# Patient Record
Sex: FEMALE | Race: WHITE | NOT HISPANIC OR LATINO | Employment: OTHER | URBAN - METROPOLITAN AREA
[De-identification: names, ages, dates, MRNs, and addresses within clinical notes are randomized per-mention and may not be internally consistent; named-entity substitution may affect disease eponyms.]

---

## 2024-02-14 ENCOUNTER — TELEPHONE (OUTPATIENT)
Dept: NEPHROLOGY | Facility: CLINIC | Age: 64
End: 2024-02-14

## 2024-02-14 NOTE — TELEPHONE ENCOUNTER
New Patient Intake Form   Patient Details   Shantell Dyer     1960     [id number]     Insurance Information   Name of Insurance Company [employer]    Does the patient need an insurance referral? no   If patient has VA insurance, please ask if they will be using their VA insurance.   Appointment Information   Who is calling to schedule?  If not patient, what is callers name? Patient and Miranda from PCP's office   [facility]   Referring Provider  Dr. Bella King    Reason for Appt (Diagnosis) CKD STAGE 4    Does Patient have labs/urine done at [facility]?  If not, where do they go?  List the date of last lab / urine  *Please try to get labs 2 years back if not at  Yes, [facility]    Has patient been hospitalized recently?  If yes, list name and location of hospital they were in yes   Has patient been seen by a Nephrologist before?  If yes, list name, location and phone number no   Has the patient had renal imaging done?  If so, list the most recent date and type of imaging Yes, [facility]    Does patient have a history of Kidney Stones? yes   Appointment Details   Is there a referral on file? yes    Appointment Date 4/16/24   Location  [facility]   Miscellaneous   added to wait list.

## 2024-02-15 ENCOUNTER — TELEPHONE (OUTPATIENT)
Dept: NEPHROLOGY | Facility: CLINIC | Age: 64
End: 2024-02-15

## 2024-02-15 NOTE — TELEPHONE ENCOUNTER
Called pt to come in for a sooner appt (2/21/24) with Dr. St in the [place] office but pt said she does not want to travel to [place], only [place] or [place].

## 2024-02-15 NOTE — TELEPHONE ENCOUNTER
Called pt to see if she could see Dr. Huff in the [facility] on 2/21/24. Pt agreed and is now scheduled.

## 2024-02-20 NOTE — PROGRESS NOTES
NEPHROLOGY OUTPATIENT CONSULTATION   Shantell Dyer 63 y.o. female MRN: [id number]  Date: 2/21/2024  Reason for consultation:   Chief Complaint   Patient presents with    Consult    Chronic Kidney Disease       ASSESSMENT AND PLAN:  Chronic Kidney Disease Stage 4  -Baseline Creatinine: 2.6-3.0 mg/dl  -Renal Function has been fluctuating with multiple episodes of CANDACE, recently around 2.6-3.0. Last Creatinine was 2.95 mg/dl, EGFR 17  -Etiology: Chronic kidney disease likely due to hypertensive nephrosclerosis, sequelae of recurrent CANDACE, obstructive uropathy, chronic cardiorenal syndrome and age-related nephron loss.  -Patient had cystoscopy with right ureteral stent placement on 2/19 which was the last urology prior to today's visit  -Lasix nuclear scan suggestive of decreased perfusion to both kidneys, left kidney function is at 33% and right kidney function is a 67% also with severely delayed and minimally excreted radio tracer in the left collecting system indicating severe renal dysfunction on the left side and also on the right side  -Plan:   Continue to monitor renal function.  Repeat BMP this week  to monitor renal function status post recent lithotripsy and right ureteral stent exchange.  Recommend oral hydration 60 ounces per day  Avoid Nephrotoxins like NSAIDs and IV contrast.   CKD Education: reffered for modality education  Low k diet and low phosphorus diet discussed.  Follow up in 3 months with labs check BMP this weekend in a month as well.  Discussed about indication for dialysis discussed about uremic symptoms and to call office if she develops any uremic symptoms    Primary Hypertension with CKD4 :   -Current medication: hydralazine 50 mg tid , metoprolol 50 mg daily   -Current blood pressure: stable. Says BP at 120's at home   -Plan:     Continue same meds   -Recommend 2 g sodium diet.    -Recommend daily exercise and weight loss  -Discussed home monitoring of BP and maintaining a log of blood  pressure.  -Recommend goal BP less than 130/80.     Nephrolithiasis  --CAT scan from January 4 mentions about mild right hydronephrosis, upper pole parapelvic cyst, stones at the UPJ on the right.  No left hydronephrosis or nephrolithiasis  -Prior CT from January 21, 2024 did not show any hydronephrosis, finding of bilateral renal cyst. -CT abdomen from February 2024: Mild right hydronephrosis.  Stones in the right renal pelvis measuring 3 to 4 mm in size-.  Evidence of mild left hydronephrosis and mild right hydronephrosis  -PTH from October 13, 2023 was slightly elevated at 139 and vitamin D was normal at 32 at that time  -Interventions: Patient with multiple urologic interventions at this time, records from urology note from [facility] was reviewed  CYSTOSCOPY W/ LASER LITHOTRIPSY 12/07/2021 .CYSTOSCOPY, b/l URETEROSCOPY, HOLMIUM laser lithutripy b/l renal stones, basket extraction of bl renal stones, b/l retrograde pylogram, b/l ueretral stent placement, removal of b/l neph tube  CYSTOSCOPY W/ URETEROSCOPY Bilateral 04/11/2022  and again on  Right 08/22/2022 4/14/2023. Status post left ureteroscopy and double-J stent placement. 3 [place] UPJ intrinsic stenosis.   Status post open bilateral pyeloplasty as per urology note was done in October 2023 2/19/2024-CYSTOSCOPY WITH RIGHT URETEROSCOPY WITH LASER LITHOTRIPSY, WITH RIGHT DOUBLE J STENT EXCHANGE, RETROGRADE  at [facility]  -Stone Analysis: October 2022 calcium oxalate monohydrate.  Previously in 2021 also suggestive of 90% calcium oxalate monohydrate and 10% calcium oxalate dihydrate  -Stone Risk Profile:Ordered   -Plan:   Stressed on oral hydration more than 70 ounces per day.  Stressed on hydration throughout the day to maintain urine output at least 2000 mL/day.  24 hrs urine stone risk profile   Recommend low oxalate, Low Sodium and high calcium diet    Obstructive uropathy/right hydronephrosis and left hydronephrosis  -Status post cystoscopy and right ureteral  stent placement on 2/19 continue to follow-up with urology  -Imaging study per urology    Metabolic acidosis  -Bicarbonate level was 19, check BMP and if it is still low would consider starting on sodium bicarbonate tablets    Hyperphosphatemia: Last phosphorus was 5.8, three weeks back, recommend low phosphorus diet    Persistent proteinuria  -office UA positive for protein, urine microscopy positive for numerous WBCs but no RBCs, no UTI symptoms currently receiving antibiotics  -Last UPC ratio: 1.07 g  -Proteinuria Likely due to hypertension.  Check SPEP UPEP light chain ratio  -Not on ACE inhibitor, would not start on ACE inhibitor at this time in the setting of recurrent CANDACE and k  on higher side   -Stressed on Goal A1c <7.0 and Goal BP <130/80 mmHg  -Continue to monitor.    Anemia iron deficiency  -hb was 10.3 g/dl, iron saturation 8.  Started on ferrous sulfate once daily    Nonischemic cardiomyopathy, chronic systolic CHF ejection fraction 24%, grade 2 diastolic dysfunction  -Never been on any diuretics in the past clinically appears euvolemic, continue to monitor    Secondary hyper parathyroidism of renal origin: PTH was elevated in October, monitor PTH level before the next office visit.  Prior vitamin D level was abnormal range    Tobacco use disorder  -stressed on quitting smoking, patient transferred back on the smoking    History of esophagitis, status post EGD in the past.  Continue management per GI.    Diagnoses and all orders for this visit:    Stage 4 chronic kidney disease (HCC)  -     POCT urine dip  -     Basic metabolic panel; Future  -     Ambulatory referral to ckd education program; Future  -     Stone risk profile; Future  -     Basic metabolic panel; Future  -     Phosphorus; Future  -     Protein / creatinine ratio, urine; Future  -     CBC; Future  -     Magnesium; Future  -     PTH, intact; Future  -     Urinalysis with microscopic; Future  -     Basic metabolic panel; Future    Benign  hypertension with CKD (chronic kidney disease) stage IV ([facility])  -     Stone risk profile; Future  -     Basic metabolic panel; Future    Persistent proteinuria, unspecified  -     Protein / creatinine ratio, urine; Future    Iron deficiency anemia, unspecified iron deficiency anemia type  -     CBC; Future  -     Iron Panel (Includes Ferritin, Iron Sat%, Iron, and TIBC); Future    Hyperphosphatemia  -     Phosphorus; Future    Nephrolithiasis  -     Stone risk profile; Future    Chronic systolic CHF (congestive heart failure) ([facility])  -     Stone risk profile; Future    Secondary hyperparathyroidism of renal origin ([facility])  -     PTH, intact; Future  -     Vitamin D 25 hydroxy; Future    Other orders  -     cefuroxime (CEFTIN) 500 mg tablet; Take 500 mg by mouth 2 (two) times a day  -     hydrALAZINE (APRESOLINE) 50 mg tablet; Take 50 mg by mouth Three times a day  -     metoprolol succinate (TOPROL-XL) 50 mg 24 hr tablet; Take 50 mg by mouth daily  -     oxybutynin (DITROPAN) 5 mg tablet; Take 5 mg by mouth 2 (two) times a day  -     traMADol (ULTRAM) 50 mg tablet; Take 50 mg by mouth every 6 (six) hours as needed for moderate pain or severe pain        Patient Instructions   -Renal Function is stable  -You have Chronic Kidney Disease Stage 4   -Avoid NSAIDs like Ibuprofen/Motrin, Naproxen/Aleve, Celebrex, meloxicam/Mobic, Diclofenac and other NSAIDs.  -Okay to take Acetaminophen/Tylenol if you do not have any liver problems  -Avoid IV contrast used for CT scan and cardiac catheterization.    -If plan for any study with IV contrast, please let me know so we could hydrate with fluids before and after IV contrast  -Dosage  of certain medications may need to be adjusted depending on Kidney function.     Blood pressure is stable    -Recommend 2 g sodium diet.    -Recommend daily exercise and weight loss  -Discussed home monitoring of BP and maintaining a log of blood pressure.  -Recommend goal BP less than 130/80. Please  inform me if SBP below 110 or above 140's persistently.  Check blood work this week, check 24-hour stone risk profile.  Recommend increasing oral hydration to 70 ounces per day.  Recommend low oxalate diet low phosphate and low potassium diet.  Work again in a month, follow-up in 3 months with repeat labs           Potassium Content of Foods List   WHAT YOU NEED TO KNOW:   Potassium is a mineral that is found in most foods. Potassium helps to balance fluids and minerals in your body. It also helps your body maintain a normal blood pressure. Potassium helps your muscles contract and your nerves function normally.  DISCHARGE INSTRUCTIONS:   Why you may need to change the amount of potassium you eat:   You may need more potassium  if you have hypokalemia (low potassium levels) or high blood pressure. You may also need more potassium if you are taking diuretics. Diuretics and certain medicines cause your body to lose potassium.     You may need less potassium  in your diet if you have hyperkalemia (high potassium levels) or kidney disease.    Potassium content of fruit:  The amount of potassium in milligrams (mg) contained in each fruit or serving of fruit is listed beside the item.  High-potassium foods (more than 200 mg per serving):      1 medium banana (425)    ½ of a papaya (390)    ½ cup of prune juice (370)    ¼ cup of raisins (270)    1 medium lenka (325) or kiwi (240)    1 small orange (240) or ½ cup of orange juice (235)    ½ cup of cubed cantaloupe (215) or diced honeydew melon (200)    1 medium pear (200)    Medium-potassium foods (50 to 200 mg per serving):      1 medium peach (185)    1 small apple or ½ cup of apple juice (150)    ½ cup of peaches canned in juice (120)    ½ cup of canned pineapple (100)    ½ cup of fresh, sliced strawberries (125)    ½ cup of watermelon (85)    Low-potassium foods (less than 50 mg per serving):      ½ cup of cranberries (45) or cranberry juice cocktail (20)    ½ cup of  nectar of papaya, lenka, or pear (35)    Potassium content of vegetables:   High-potassium foods (more than 200 mg per serving):      1 medium baked potato, with skin (925)    1 baked medium sweet potato, with skin (450)    ½ cup of tomato or vegetable juice (275), or 1 medium raw tomato (290)    ½ cup of mushrooms (280)    ½ cup of fresh brussels sprouts (250)    ½ cup of cooked zucchini (220) or winter squash (250)    ¼ of a medium avocado (245)    ½ cup of broccoli (230)    Medium-potassium foods (50 to 200 mg per serving):      ½ cup of corn (195)    ½ cup of fresh or cooked carrots (180)    ½ cup of fresh cauliflower (150)    ½ cup of asparagus (155)    ½ cup of canned peas (90)     1 cup of lettuce, all types (100)    ½ cup of fresh green beans (90)    ½ cup of frozen green beans (85)    ½ cup of cucumber (80)    Potassium content of protein foods:   High-potassium foods (more than 200 mg per serving):      ½ cup of cooked amado beans (400) or lentils (365)    1 cup of soy milk (300)    3 ounces of baked or broiled salmon (319)    3 ounces of roasted turkey, dark meat (250)    ¼ cup of sunflower seeds (241)    3 ounces of cooked lean beef (224)    2 tablespoons of smooth peanut butter (210)    Medium-potassium foods (50 to 200 mg per serving):      1 ounce of salted peanuts, almonds, or cashews (200)    1 large egg (60 mg)    Potassium content of dairy foods:   High-potassium foods (more than 200 mg per serving):      6 ounces of yogurt (260 to 435)    1 cup of nonfat, low-fat, or whole milk (350 to 380)    Medium-potassium foods (50 to 200 mg per serving):      ½ cup of ricotta cheese (154)    ½ cup of vanilla ice cream (131)    ½ cup of low-fat (2%) cottage cheese (110)    Low-potassium foods (less than 50 mg per serving):      1 ounce of cheese (20 to 30)      Potassium content of grains:   1 slice of white bread (30)    ½ cup of white or brown rice (50)    ½ cup of spaghetti or macaroni (30)    1 flour or  corn tortilla (50)    1 four-inch waffle (50)    Potassium content of other foods:   1 tablespoon of molasses (295)    1½ ounces of chocolate (165)    Some salt substitutes may contain a high amount of potassium. Check the food label to find the amount of potassium it contains.    © Copyright [employer] 2023 Information is for End User's use only and may not be sold, redistributed or otherwise used for commercial purposes.  The above information is an  only. It is not intended as medical advice for individual conditions or treatments. Talk to your doctor, nurse or pharmacist before following any medical regimen to see if it is safe and effective for you.      Low Oxalate Diet   WHAT YOU NEED TO KNOW:   What is a low-oxalate diet?  Oxalate is a chemical found in plant foods. You may need to eat foods that are low in oxalate to help clear kidney stones or prevent them from forming. People who have had kidney stones are at a higher risk of forming kidney stones again. The most common type of kidney stone is made up of crystals that contain calcium and oxalate. Your healthcare provider or dietitian may recommend that you limit oxalate if you get this type of kidney stone often.       Which foods should I include?  Include the following foods that have a low to medium amount of oxalate.  Grains:      Egg noodles    Mohit crackers    Pancakes and waffles    Cooked and dry cereals without nuts or bran    White or wild rice    White bread, cornbread, bagels, and white English muffins (medium oxalate)    Saltine or soda crackers and vanilla wafers (medium oxalate)    Brown rice, spaghetti, and other noodles and pastas (medium oxalate)    Fruit:      Apples, bananas, grapes    Cranberries    Peaches, nectarines, apricots, and pears    Papayas and strawberries    Melons and pineapples    Blackberries, blueberries, mangoes, and prunes (medium oxalate)    Vegetables:      Artichokes, asparagus, and brussels  sprouts    Broccoli and cauliflower    Kale, endive, cabbage, and lettuce    Cucumbers, peas, and zucchini    Mushrooms, onions, and peppers    Corn    Carrots, celery, and green beans (medium oxalate)    Parsnips, summer squash, tomatoes, and turnips (medium oxalate)    Dairy:      American cheese, Swiss cheese, cottage cheese, ricotta cheese, and cheddar cheese    Milk and buttermilk    Yogurt    Protein foods:      Meat, fish, shellfish, chicken, and turkey    Lunch meat and ham (medium oxalate)    Hot dogs, bratwurst, miner, and sausage (medium oxalate)    Drinks and desserts:      Coffee    Fruit punch and lemonade or limeade without added vitamin C    Desserts:      Cookies, cakes, and ice cream    Pudding without chocolate    Which foods should I limit or avoid?  Limit the following foods that are high in oxalate.  Grains:      Wheat bran, wheat germ, and barley    Grits and bran cereal    White corn flour and buckwheat flour    Whole wheat bread    Fruit:      Dried apricots    Red currants, figs, and rhubarb    Kiwi    Grapefruit    Vegetables:      Potatoes and yams    Tunde greens, leeks, okra, and spinach    Wax beans     Eggplant    Beets and beet greens    Swiss chard, escarole, parsley, and rutabagas    Tomato paste    Protein foods:      Baked beans with tomato sauce    Nut butters and nuts (peanuts, almonds, pecans, cashews, hazelnuts)    Soy burgers    Miso    Dried beans    Desserts:      Fruitcake    Chocolate    Carob and marmalade    Beverages:      Chocolate drink mixes    Soy milk    Instant iced tea    Other foods:      Sesame seeds and tahini (paste made of sesame seeds)    Poppy seeds    What other dietary guidelines should I follow?   Drink about 12 to 16 (eight-ounce) cups of liquid each day.  Liquids help clear kidney stones and prevent them from forming again. Water is the best liquid to drink. You may need more liquid if you are physically active. Ask your healthcare provider or  dietitian how much liquid you need to drink each day.    Your healthcare provider may suggest that you make other diet changes to help prevent kidney stones.  This may include decreasing the amount of sodium you eat each day.    CARE AGREEMENT:   You have the right to help plan your care. Discuss treatment options with your healthcare provider to decide what care you want to receive. You always have the right to refuse treatment. The above information is an  only. It is not intended as medical advice for individual conditions or treatments. Talk to your doctor, nurse or pharmacist before following any medical regimen to see if it is safe and effective for you.  © Copyright [employer] 2023 Information is for End User's use only and may not be sold, redistributed or otherwise used for commercial purposes.      HISTORY OF PRESENT ILLNESS:  Shantell Dyer is a 63 y.o. year old female with medical issues of hypertension x 3 yrs , nonischemic cardiomyopathy, irritable bowel syndrome who presents for initial consultation for chronic kidney disease stage IV    -Reviewed epic creatinine was around 1.0-1.3 at baseline in 2021.  Renal function worsened since 2022 and creatinine was around 1.8-1.9 in 2022.  Had acute kidney injury in March 2023 and creatinine improved to 3.0 mg/dL at that time, in October 2023 renal function was around creatinine 3.5 mg/dL, worsened to 4.5 mg/dL at that time.  Patient had bilateral pyeloplasty done in October 2023.  Renal function further worsened in January 2024 to peak creatinine 6.3 on January 24 but after that renal function improved and at the time of discharge on January 27 creatinine was 4.8 mg/dL  - she had right ureteral stent exchange on January 21.  After that he developed cardiogenic shock and was admitted to ICU.  Required diuresis with IV Lasix and milrinone drip.  Renal function worsened to peak creatinine 6.3 on January 24 but improved to creatinine 4.8 on January 27  at the time of discharge.  Echo showed ejection fraction 24%, as per recent hospital note was related to methamphetamine use and nonischemic cardiomyopathy.  She left hospital AMA and again presented to ED on 2/8/2024 with abdominal pain nausea vomiting, was found to have hyperkalemia potassium-5.5 but renal function was better at creatinine 2.65 mg/dL.  she was treated with bicarbonate infusion and with Lokelma  - it appears she has not followed up with multiple appointments at [facility] nephrology in the past.  She is interested at follow-up with [facility] nephrology    -Renal function improved further in February and has been around 2.5 to 2.9 mg/dL.  Last blood work from 2/12 showed creatinine 2.9 mg/dL.  Most likely baseline creatinine is around 2.6 to 3.0 mg/dL    Patient was recently at [facility] about  2/19/24.  Underwent cystoscopy with right ureteroscopy with laser lithotripsy and right ureteral stent by urology, was started on oxybutynin and tramadol at the time of discharge    She currently denies any symptoms    REVIEW OF SYSTEMS:    Review of Systems   Constitutional:  Negative for chills and fever.   HENT:  Negative for ear pain and sore throat.    Eyes:  Negative for pain and visual disturbance.   Respiratory:  Negative for cough and shortness of breath.    Cardiovascular:  Negative for chest pain and palpitations.   Gastrointestinal:  Negative for abdominal pain and vomiting.   Genitourinary:  Negative for dysuria and hematuria.   Musculoskeletal:  Negative for arthralgias and back pain.   Skin:  Negative for color change and rash.   Neurological:  Negative for seizures and syncope.   All other systems reviewed and are negative.      More than 10 point review of systems were obtained and discussed in length with the patient.     PAST MEDICAL HISTORY:  Past Medical History:   Diagnosis Date    CHF (congestive heart failure) (HCC)     Chronic kidney disease     Hypertension     Kidney stone        PAST  "SURGICAL HISTORY:  Past Surgical History:   Procedure Laterality Date    KIDNEY STONE SURGERY      LITHOTRIPSY      PYELOPLASTY  10/2023       ALLERGIES:  Allergies   Allergen Reactions    Sacubitril-Valsartan Other (See Comments)       SOCIAL HISTORY:  Social History     Substance and Sexual Activity   Alcohol Use Never     Social History     Substance and Sexual Activity   Drug Use Yes    Types: Marijuana     Social History     Tobacco Use   Smoking Status Every Day    Types: Cigarettes   Smokeless Tobacco Never       FAMILY HISTORY:  History reviewed. No pertinent family history.    MEDICATIONS:    Current Outpatient Medications:     cefuroxime (CEFTIN) 500 mg tablet, Take 500 mg by mouth 2 (two) times a day, Disp: , Rfl:     hydrALAZINE (APRESOLINE) 50 mg tablet, Take 50 mg by mouth Three times a day, Disp: , Rfl:     metoprolol succinate (TOPROL-XL) 50 mg 24 hr tablet, Take 50 mg by mouth daily, Disp: , Rfl:     oxybutynin (DITROPAN) 5 mg tablet, Take 5 mg by mouth 2 (two) times a day, Disp: , Rfl:     traMADol (ULTRAM) 50 mg tablet, Take 50 mg by mouth every 6 (six) hours as needed for moderate pain or severe pain, Disp: , Rfl:       PHYSICAL EXAM:  Vitals:    02/21/24 1541 02/21/24 1620   BP:  104/60   Pulse:  72   Weight: 52.6 kg (116 lb)    Height: 5' 4\" (1.626 m)      Body mass index is 19.91 kg/m².  Wt Readings from Last 3 Encounters:   02/21/24 52.6 kg (116 lb)     Physical Exam  Constitutional:       General: She is not in acute distress.     Appearance: Normal appearance. She is well-developed. She is not ill-appearing.   HENT:      Head: Normocephalic and atraumatic.      Nose: Nose normal.      Mouth/Throat:      Mouth: Mucous membranes are moist.   Eyes:      General: No scleral icterus.     Conjunctiva/sclera: Conjunctivae normal.      Pupils: Pupils are equal, round, and reactive to light.   Neck:      Thyroid: No thyromegaly.      Vascular: No JVD.   Cardiovascular:      Rate and Rhythm: Normal " "rate and regular rhythm.      Heart sounds: Normal heart sounds. No murmur heard.     No friction rub.   Pulmonary:      Effort: Pulmonary effort is normal. No respiratory distress.      Breath sounds: Normal breath sounds. No wheezing or rales.   Abdominal:      General: Bowel sounds are normal. There is no distension.      Palpations: Abdomen is soft.      Tenderness: There is no abdominal tenderness.   Musculoskeletal:         General: No deformity.      Cervical back: Neck supple.      Right lower leg: No edema.      Left lower leg: No edema.   Skin:     General: Skin is warm and dry.      Findings: No rash.   Neurological:      Mental Status: She is alert and oriented to person, place, and time.   Psychiatric:         Mood and Affect: Mood normal.         Behavior: Behavior normal.         Thought Content: Thought content normal.           Lab Results:   Results for orders placed or performed in visit on 02/21/24   POCT urine dip   Result Value Ref Range    LEUKOCYTE ESTERASE,UA 70+     NITRITE,UA -     SL AMB POCT UROBILINOGEN -     POCT URINE PROTEIN 100+      PH,UA 5.0     BLOOD,UA -     SPECIFIC GRAVITY,UA 1.015     KETONES,UA -     BILIRUBIN,UA -     GLUCOSE, UA -      COLOR,UA yellow     CLARITY,UA cloudy              Invalid input(s): \"ALBUMIN\"  Reviewed multiple records as well as labs from care everywhere from [facility]    I have spent a total time of 70 minutes on 02/21/24 in caring for this patient including Diagnostic results, Prognosis, Risks and benefits of tx options, Instructions for management, Patient and family education, Importance of tx compliance, Risk factor reductions, Impressions, Counseling / Coordination of care, Documenting in the medical record, Reviewing / ordering tests, medicine, procedures  , Obtaining or reviewing history  , and discussion about indication for dialysis .  Patient will need very close monitoring and ongoing follow-up for this complex medical condition, advanced CKD " "stage IV with past history of recurrent acute kidney injury    Portions of the record may have been created with voice recognition software. Occasional wrong word or \"sound a like\" substitutions may have occurred due to the inherent limitations of voice recognition software. Read the chart carefully and recognize, using context, where substitutions have occurred.    "

## 2024-02-21 ENCOUNTER — CONSULT (OUTPATIENT)
Dept: NEPHROLOGY | Facility: CLINIC | Age: 64
End: 2024-02-21
Payer: COMMERCIAL

## 2024-02-21 VITALS
BODY MASS INDEX: 19.81 KG/M2 | HEIGHT: 64 IN | SYSTOLIC BLOOD PRESSURE: 104 MMHG | HEART RATE: 72 BPM | WEIGHT: 116 LBS | DIASTOLIC BLOOD PRESSURE: 60 MMHG

## 2024-02-21 DIAGNOSIS — R80.1 PERSISTENT PROTEINURIA, UNSPECIFIED: ICD-10-CM

## 2024-02-21 DIAGNOSIS — D50.9 IRON DEFICIENCY ANEMIA, UNSPECIFIED IRON DEFICIENCY ANEMIA TYPE: ICD-10-CM

## 2024-02-21 DIAGNOSIS — N25.81 SECONDARY HYPERPARATHYROIDISM OF RENAL ORIGIN (HCC): ICD-10-CM

## 2024-02-21 DIAGNOSIS — N18.4 BENIGN HYPERTENSION WITH CKD (CHRONIC KIDNEY DISEASE) STAGE IV (HCC): ICD-10-CM

## 2024-02-21 DIAGNOSIS — I50.22 CHRONIC SYSTOLIC CHF (CONGESTIVE HEART FAILURE) (HCC): ICD-10-CM

## 2024-02-21 DIAGNOSIS — I12.9 BENIGN HYPERTENSION WITH CKD (CHRONIC KIDNEY DISEASE) STAGE IV (HCC): ICD-10-CM

## 2024-02-21 DIAGNOSIS — E83.39 HYPERPHOSPHATEMIA: ICD-10-CM

## 2024-02-21 DIAGNOSIS — N18.4 STAGE 4 CHRONIC KIDNEY DISEASE (HCC): Primary | ICD-10-CM

## 2024-02-21 DIAGNOSIS — N20.0 NEPHROLITHIASIS: ICD-10-CM

## 2024-02-21 LAB
SL AMB  POCT GLUCOSE, UA: ABNORMAL
SL AMB LEUKOCYTE ESTERASE,UA: ABNORMAL
SL AMB POCT BILIRUBIN,UA: ABNORMAL
SL AMB POCT BLOOD,UA: ABNORMAL
SL AMB POCT CLARITY,UA: ABNORMAL
SL AMB POCT COLOR,UA: YELLOW
SL AMB POCT KETONES,UA: ABNORMAL
SL AMB POCT NITRITE,UA: ABNORMAL
SL AMB POCT PH,UA: 5
SL AMB POCT SPECIFIC GRAVITY,UA: 1.01
SL AMB POCT URINE PROTEIN: ABNORMAL
SL AMB POCT UROBILINOGEN: ABNORMAL

## 2024-02-21 PROCEDURE — 99205 OFFICE O/P NEW HI 60 MIN: CPT | Performed by: INTERNAL MEDICINE

## 2024-02-21 PROCEDURE — 81002 URINALYSIS NONAUTO W/O SCOPE: CPT | Performed by: INTERNAL MEDICINE

## 2024-02-21 RX ORDER — TRAMADOL HYDROCHLORIDE 50 MG/1
50 TABLET ORAL EVERY 6 HOURS PRN
COMMUNITY
Start: 2023-10-11 | End: 2025-02-18

## 2024-02-21 RX ORDER — OXYBUTYNIN CHLORIDE 5 MG/1
5 TABLET ORAL 2 TIMES DAILY
COMMUNITY
Start: 2024-02-19 | End: 2024-02-24

## 2024-02-21 RX ORDER — HYDRALAZINE HYDROCHLORIDE 50 MG/1
50 TABLET, FILM COATED ORAL 3 TIMES DAILY
COMMUNITY
Start: 2024-01-27 | End: 2025-01-26

## 2024-02-21 RX ORDER — METOPROLOL SUCCINATE 50 MG/1
50 TABLET, EXTENDED RELEASE ORAL DAILY
COMMUNITY
Start: 2024-01-28 | End: 2025-01-27

## 2024-02-21 RX ORDER — CEFUROXIME AXETIL 500 MG/1
500 TABLET ORAL 2 TIMES DAILY
COMMUNITY
Start: 2024-02-16 | End: 2024-02-26

## 2024-02-21 NOTE — PATIENT INSTRUCTIONS
-Renal Function is stable  -You have Chronic Kidney Disease Stage 4   -Avoid NSAIDs like Ibuprofen/Motrin, Naproxen/Aleve, Celebrex, meloxicam/Mobic, Diclofenac and other NSAIDs.  -Okay to take Acetaminophen/Tylenol if you do not have any liver problems  -Avoid IV contrast used for CT scan and cardiac catheterization.    -If plan for any study with IV contrast, please let me know so we could hydrate with fluids before and after IV contrast  -Dosage  of certain medications may need to be adjusted depending on Kidney function.     Blood pressure is stable    -Recommend 2 g sodium diet.    -Recommend daily exercise and weight loss  -Discussed home monitoring of BP and maintaining a log of blood pressure.  -Recommend goal BP less than 130/80. Please inform me if SBP below 110 or above 140's persistently.  Check blood work this week, check 24-hour stone risk profile.  Recommend increasing oral hydration to 70 ounces per day.  Recommend low oxalate diet low phosphate and low potassium diet.  Work again in a month, follow-up in 3 months with repeat labs           Potassium Content of Foods List   WHAT YOU NEED TO KNOW:   Potassium is a mineral that is found in most foods. Potassium helps to balance fluids and minerals in your body. It also helps your body maintain a normal blood pressure. Potassium helps your muscles contract and your nerves function normally.  DISCHARGE INSTRUCTIONS:   Why you may need to change the amount of potassium you eat:   You may need more potassium  if you have hypokalemia (low potassium levels) or high blood pressure. You may also need more potassium if you are taking diuretics. Diuretics and certain medicines cause your body to lose potassium.     You may need less potassium  in your diet if you have hyperkalemia (high potassium levels) or kidney disease.    Potassium content of fruit:  The amount of potassium in milligrams (mg) contained in each fruit or serving of fruit is listed beside the  item.  High-potassium foods (more than 200 mg per serving):      1 medium banana (425)    ½ of a papaya (390)    ½ cup of prune juice (370)    ¼ cup of raisins (270)    1 medium lenka (325) or kiwi (240)    1 small orange (240) or ½ cup of orange juice (235)    ½ cup of cubed cantaloupe (215) or diced honeydew melon (200)    1 medium pear (200)    Medium-potassium foods (50 to 200 mg per serving):      1 medium peach (185)    1 small apple or ½ cup of apple juice (150)    ½ cup of peaches canned in juice (120)    ½ cup of canned pineapple (100)    ½ cup of fresh, sliced strawberries (125)    ½ cup of watermelon (85)    Low-potassium foods (less than 50 mg per serving):      ½ cup of cranberries (45) or cranberry juice cocktail (20)    ½ cup of nectar of papaya, lenka, or pear (35)    Potassium content of vegetables:   High-potassium foods (more than 200 mg per serving):      1 medium baked potato, with skin (925)    1 baked medium sweet potato, with skin (450)    ½ cup of tomato or vegetable juice (275), or 1 medium raw tomato (290)    ½ cup of mushrooms (280)    ½ cup of fresh brussels sprouts (250)    ½ cup of cooked zucchini (220) or winter squash (250)    ¼ of a medium avocado (245)    ½ cup of broccoli (230)    Medium-potassium foods (50 to 200 mg per serving):      ½ cup of corn (195)    ½ cup of fresh or cooked carrots (180)    ½ cup of fresh cauliflower (150)    ½ cup of asparagus (155)    ½ cup of canned peas (90)     1 cup of lettuce, all types (100)    ½ cup of fresh green beans (90)    ½ cup of frozen green beans (85)    ½ cup of cucumber (80)    Potassium content of protein foods:   High-potassium foods (more than 200 mg per serving):      ½ cup of cooked amado beans (400) or lentils (365)    1 cup of soy milk (300)    3 ounces of baked or broiled salmon (319)    3 ounces of roasted turkey, dark meat (250)    ¼ cup of sunflower seeds (241)    3 ounces of cooked lean beef (224)    2 tablespoons of smooth  peanut butter (210)    Medium-potassium foods (50 to 200 mg per serving):      1 ounce of salted peanuts, almonds, or cashews (200)    1 large egg (60 mg)    Potassium content of dairy foods:   High-potassium foods (more than 200 mg per serving):      6 ounces of yogurt (260 to 435)    1 cup of nonfat, low-fat, or whole milk (350 to 380)    Medium-potassium foods (50 to 200 mg per serving):      ½ cup of ricotta cheese (154)    ½ cup of vanilla ice cream (131)    ½ cup of low-fat (2%) cottage cheese (110)    Low-potassium foods (less than 50 mg per serving):      1 ounce of cheese (20 to 30)      Potassium content of grains:   1 slice of white bread (30)    ½ cup of white or brown rice (50)    ½ cup of spaghetti or macaroni (30)    1 flour or corn tortilla (50)    1 four-inch waffle (50)    Potassium content of other foods:   1 tablespoon of molasses (295)    1½ ounces of chocolate (165)    Some salt substitutes may contain a high amount of potassium. Check the food label to find the amount of potassium it contains.    © Copyright [employer] 2023 Information is for End User's use only and may not be sold, redistributed or otherwise used for commercial purposes.  The above information is an  only. It is not intended as medical advice for individual conditions or treatments. Talk to your doctor, nurse or pharmacist before following any medical regimen to see if it is safe and effective for you.      Low Oxalate Diet   WHAT YOU NEED TO KNOW:   What is a low-oxalate diet?  Oxalate is a chemical found in plant foods. You may need to eat foods that are low in oxalate to help clear kidney stones or prevent them from forming. People who have had kidney stones are at a higher risk of forming kidney stones again. The most common type of kidney stone is made up of crystals that contain calcium and oxalate. Your healthcare provider or dietitian may recommend that you limit oxalate if you get this type of kidney  stone often.       Which foods should I include?  Include the following foods that have a low to medium amount of oxalate.  Grains:      Egg noodles    Mohit crackers    Pancakes and waffles    Cooked and dry cereals without nuts or bran    White or wild rice    White bread, cornbread, bagels, and white English muffins (medium oxalate)    Saltine or soda crackers and vanilla wafers (medium oxalate)    Brown rice, spaghetti, and other noodles and pastas (medium oxalate)    Fruit:      Apples, bananas, grapes    Cranberries    Peaches, nectarines, apricots, and pears    Papayas and strawberries    Melons and pineapples    Blackberries, blueberries, mangoes, and prunes (medium oxalate)    Vegetables:      Artichokes, asparagus, and brussels sprouts    Broccoli and cauliflower    Kale, endive, cabbage, and lettuce    Cucumbers, peas, and zucchini    Mushrooms, onions, and peppers    Corn    Carrots, celery, and green beans (medium oxalate)    Parsnips, summer squash, tomatoes, and turnips (medium oxalate)    Dairy:      American cheese, Swiss cheese, cottage cheese, ricotta cheese, and cheddar cheese    Milk and buttermilk    Yogurt    Protein foods:      Meat, fish, shellfish, chicken, and turkey    Lunch meat and ham (medium oxalate)    Hot dogs, bratwurst, miner, and sausage (medium oxalate)    Drinks and desserts:      Coffee    Fruit punch and lemonade or limeade without added vitamin C    Desserts:      Cookies, cakes, and ice cream    Pudding without chocolate    Which foods should I limit or avoid?  Limit the following foods that are high in oxalate.  Grains:      Wheat bran, wheat germ, and barley    Grits and bran cereal    White corn flour and buckwheat flour    Whole wheat bread    Fruit:      Dried apricots    Red currants, figs, and rhubarb    Kiwi    Grapefruit    Vegetables:      Potatoes and yams    Tunde greens, leeks, okra, and spinach    Wax beans     Eggplant    Beets and beet greens    Swiss  chard, escarole, parsley, and rutabagas    Tomato paste    Protein foods:      Baked beans with tomato sauce    Nut butters and nuts (peanuts, almonds, pecans, cashews, hazelnuts)    Soy burgers    Miso    Dried beans    Desserts:      Fruitcake    Chocolate    Carob and marmalade    Beverages:      Chocolate drink mixes    Soy milk    Instant iced tea    Other foods:      Sesame seeds and tahini (paste made of sesame seeds)    Poppy seeds    What other dietary guidelines should I follow?   Drink about 12 to 16 (eight-ounce) cups of liquid each day.  Liquids help clear kidney stones and prevent them from forming again. Water is the best liquid to drink. You may need more liquid if you are physically active. Ask your healthcare provider or dietitian how much liquid you need to drink each day.    Your healthcare provider may suggest that you make other diet changes to help prevent kidney stones.  This may include decreasing the amount of sodium you eat each day.    CARE AGREEMENT:   You have the right to help plan your care. Discuss treatment options with your healthcare provider to decide what care you want to receive. You always have the right to refuse treatment. The above information is an  only. It is not intended as medical advice for individual conditions or treatments. Talk to your doctor, nurse or pharmacist before following any medical regimen to see if it is safe and effective for you.  © Copyright [employer] 2023 Information is for End User's use only and may not be sold, redistributed or otherwise used for commercial purposes.

## 2024-03-04 ENCOUNTER — TELEPHONE (OUTPATIENT)
Dept: OTHER | Facility: HOSPITAL | Age: 64
End: 2024-03-04

## 2024-03-04 DIAGNOSIS — E55.9 VITAMIN D DEFICIENCY: ICD-10-CM

## 2024-03-04 DIAGNOSIS — E87.20 METABOLIC ACIDOSIS: ICD-10-CM

## 2024-03-04 DIAGNOSIS — D50.9 IRON DEFICIENCY ANEMIA: Primary | ICD-10-CM

## 2024-03-04 DIAGNOSIS — N18.4 CHRONIC KIDNEY DISEASE, STAGE 4 (SEVERE) (HCC): ICD-10-CM

## 2024-03-04 RX ORDER — SODIUM BICARBONATE 650 MG/1
650 TABLET ORAL 2 TIMES DAILY
Qty: 180 TABLET | Refills: 3 | Status: SHIPPED | OUTPATIENT
Start: 2024-03-04

## 2024-03-04 RX ORDER — MULTIVIT-MIN/IRON/FOLIC ACID/K 18-600-40
4000 CAPSULE ORAL DAILY
Qty: 180 CAPSULE | Refills: 3 | Status: SHIPPED | OUTPATIENT
Start: 2024-03-04

## 2024-03-04 RX ORDER — FERROUS SULFATE 324(65)MG
324 TABLET, DELAYED RELEASE (ENTERIC COATED) ORAL
Qty: 90 TABLET | Refills: 3 | Status: SHIPPED | OUTPATIENT
Start: 2024-03-04

## 2024-03-04 NOTE — TELEPHONE ENCOUNTER
Not able to reach patient.  Phone keeps ringing- not able to leave message.   reviewed labs renal function stable at creatinine 2.63 mg/dL.  Bicarb level is low at 16.  Hemoglobin is low at 8.8 g/dL with iron saturation of 9%.  PTH level is slightly elevated due to low vitamin D of 19.  Has proteinuria with UPC ratio of 1.48    Plan:  Renal function is stable  Started on sodium bicarbonate tablets 650 mg twice daily  She was recently started on ferrous sulfate, will monitor hemoglobin for now.     Started on vitamin D 4000 units daily  Please ask patient to go for repeat BMP and CBC in 1 month, already ordered in [facility] but may have to mail the prescription as she goes to [facility]

## 2024-03-04 NOTE — TELEPHONE ENCOUNTER
Patient is aware and stating that she is no taking ferrous sulfate.       Labs mailed to patient home address.

## 2024-03-19 LAB
COLLECT DURATION TIME UR: 24 HR
CREAT 24H UR-MRATE: 0.01 G/KG/24 HR (ref 0.01–0.02)
PRESERVED URINE: ABNORMAL
SPECIMEN VOL 24H UR: 1900 ML

## 2024-03-20 LAB
CALCIUM 24H UR-MRATE: 38 MG/24 HR (ref 100–300)
CHLORIDE 24H UR-SRATE: 95 MMOL/24 HR (ref 110–250)
CITRATE 24H UR-MRATE: ABNORMAL MG/D (ref 320–1240)
OXALATE/CREAT 24H UR: ABNORMAL MG/D (ref 13–40)
PH 24H UR: 6.7 [PH]
PHOSPHATE 24H UR-MRATE: 480.7 MG/24 HR (ref 300–1300)
POTASSIUM 24H UR-SRATE: 34 MMOL/24 HR (ref 25–125)
SODIUM 24H UR-SRATE: 108 MMOL/24 HR (ref 40–220)
URATE 24H UR-MRATE: 266 MG/24 HR (ref 250–750)

## 2024-03-25 ENCOUNTER — TELEPHONE (OUTPATIENT)
Dept: OTHER | Facility: HOSPITAL | Age: 64
End: 2024-03-25

## 2024-03-25 LAB
CALCIUM 24H UR-MRATE: 38 MG/24 HR (ref 100–300)
CHLORIDE 24H UR-SRATE: 95 MMOL/24 HR (ref 110–250)
CITRATE 24H UR-MRATE: 61 MG/D (ref 320–1240)
OXALATE/CREAT 24H UR: 23 MG/D (ref 13–40)
PH 24H UR: 6.7 [PH]
PHOSPHATE 24H UR-MRATE: 480.7 MG/24 HR (ref 300–1300)
POTASSIUM 24H UR-SRATE: 34 MMOL/24 HR (ref 25–125)
SODIUM 24H UR-SRATE: 108 MMOL/24 HR (ref 40–220)
URATE 24H UR-MRATE: 266 MG/24 HR (ref 250–750)

## 2024-03-25 NOTE — TELEPHONE ENCOUNTER
Not able to reach patient, will call patient tomorrow, reviewed 24-hour urine stone risk profile.  Total volume 1.9 L, citric acid level is low at 61.  Also total creatinine is little low for adequate collection  Potassium on the higher side so would not give potassium citrate but she was recently started on sodium bicarbonate tablet which would help with alkalinization of the urine as well

## 2024-03-26 ENCOUNTER — TELEPHONE (OUTPATIENT)
Dept: OTHER | Facility: HOSPITAL | Age: 64
End: 2024-03-26

## 2024-03-26 DIAGNOSIS — E87.20 METABOLIC ACIDOSIS: ICD-10-CM

## 2024-03-26 RX ORDER — SODIUM BICARBONATE 650 MG/1
650 TABLET ORAL DAILY
Start: 2024-03-26

## 2024-03-26 NOTE — TELEPHONE ENCOUNTER
reviewed 24-hour urine stone risk profile.  Total volume 1.9 L, recommended increasing fluid intake, citric acid level is low at 61, recommend to add some lemon and drink lemon water.  Not considering potassium citrate in the setting of advanced CKD stage IV.  She is already on sodium bicarbonate tablet but currently complaining of lower extremity edema so would decrease sodium bicarbonate tablet to once daily 650 mg.  She will go for blood work on April 4 or April 6  - Also total creatinine is little low for adequate collection but she mentions she had adequate collection.

## 2024-05-04 LAB
25(OH)D3+25(OH)D2 SERPL-MCNC: 27 NG/ML (ref 30–100)
ANION GAP SERPL CALCULATED.3IONS-SCNC: 8 MMOL/L (ref 3–11)
BACTERIA URNS QL MICRO: ABNORMAL
BUN SERPL-MCNC: 48 MG/DL (ref 7–25)
CALCIUM SERPL-MCNC: 8.6 MG/DL (ref 8.5–10.1)
CHLORIDE SERPL-SCNC: 113 MMOL/L (ref 100–109)
CO2 SERPL-SCNC: 21 MMOL/L (ref 21–31)
CREAT SERPL-MCNC: 3.4 MG/DL (ref 0.4–1.1)
CYTOLOGY CMNT CVX/VAG CYTO-IMP: ABNORMAL
ERYTHROCYTE [DISTWIDTH] IN BLOOD BY AUTOMATED COUNT: 18.6 % (ref 12–16)
FERRITIN SERPL-MCNC: 10 NG/ML (ref 11–306.8)
GFR/BSA.PRED SERPLBLD CYS-BASED-ARV: 14 ML/MIN/{1.73_M2}
GLUCOSE SERPL-MCNC: 99 MG/DL (ref 65–99)
GLUCOSE UR QL STRIP: NEGATIVE MG/DL
HCT VFR BLD AUTO: 30.2 % (ref 35–43)
HGB BLD-MCNC: 9.8 G/DL (ref 11.5–14.5)
HGB UR QL STRIP: ABNORMAL MG/DL
IRON SATN MFR SERPL: 8 % (ref 20–50)
IRON SERPL-MCNC: 34 UG/DL (ref 50–212)
KETONES UR QL STRIP: NEGATIVE MG/DL
LEUKOCYTE ESTERASE UR QL STRIP: 500 /UL
MAGNESIUM SERPL-MCNC: 2 MG/DL (ref 1.4–2.2)
MCH RBC QN AUTO: 29.1 PG (ref 26–34)
MCHC RBC AUTO-ENTMCNC: 32.4 G/DL (ref 32–37)
MCV RBC AUTO: 90 FL (ref 80–100)
MUCOUS THREADS URNS QL MICRO: ABNORMAL
NITRITE UR QL STRIP: POSITIVE
PH UR: 6 [PH] (ref 4.5–8)
PLATELET # BLD AUTO: 348 THOU/CMM (ref 140–350)
PMV BLD REES-ECKER: 7.4 FL (ref 7.5–11.3)
POTASSIUM SERPL-SCNC: 4.8 MMOL/L (ref 3.5–5.2)
PROT 24H UR-MRATE: ABNORMAL MG/DL
PTH-INTACT SERPL-MCNC: 164.7 PG/ML (ref 12–88)
RBC # BLD AUTO: 3.37 MILL/CMM (ref 3.7–4.7)
RBC #/AREA URNS HPF: ABNORMAL /HPF (ref 0–2)
SL AMB POCT URINE COMMENT: ABNORMAL
SODIUM SERPL-SCNC: 142 MMOL/L (ref 135–145)
SP GR UR: 1.01 (ref 1–1.03)
SQUAMOUS #/AREA URNS HPF: ABNORMAL /LPF (ref 0–5)
TIBC SERPL-MCNC: 430 UG/DL (ref 260–430)
TRANSFERRIN SERPL-MCNC: 307 MG/DL (ref 203–362)
WBC # BLD AUTO: 7.5 THOU/CMM (ref 4–10)
WBC #/AREA URNS HPF: >100 /HPF (ref 0–5)

## 2024-05-06 DIAGNOSIS — N13.30 HYDRONEPHROSIS: Primary | ICD-10-CM

## 2024-05-06 DIAGNOSIS — N17.9 AKI (ACUTE KIDNEY INJURY) (HCC): ICD-10-CM

## 2024-05-06 NOTE — RESULT ENCOUNTER NOTE
Please inform patient that renal function has worsened to creatinine 3.4 mg/dL from previous creatinine of 2.9 mg/dL.  Please schedule appointment for kidney ultrasound to see if hydronephrosis has worsened.  Will discuss further during office visit on 5/20

## 2024-05-07 ENCOUNTER — TELEPHONE (OUTPATIENT)
Dept: NEPHROLOGY | Facility: CLINIC | Age: 64
End: 2024-05-07

## 2024-05-07 NOTE — TELEPHONE ENCOUNTER
Patient returned call, informed her of results and recommendations.  She asks that I call to help her schedule the ultrasound. Placed patient on hold and called central scheduling.  Spoke with Dot from central scheduling and warm transferred patient form there.

## 2024-05-07 NOTE — TELEPHONE ENCOUNTER
----- Message from Alan Huff MD sent at 5/6/2024  5:19 PM EDT -----  Please inform patient that renal function has worsened to creatinine 3.4 mg/dL from previous creatinine of 2.9 mg/dL.  Please schedule appointment for kidney ultrasound to see if hydronephrosis has worsened.  Will discuss further during office visit on 5/20

## 2024-05-10 ENCOUNTER — TELEPHONE (OUTPATIENT)
Dept: OTHER | Facility: HOSPITAL | Age: 64
End: 2024-05-10

## 2024-05-10 ENCOUNTER — HOSPITAL ENCOUNTER (OUTPATIENT)
Dept: RADIOLOGY | Facility: IMAGING CENTER | Age: 64
End: 2024-05-10
Payer: COMMERCIAL

## 2024-05-10 ENCOUNTER — APPOINTMENT (OUTPATIENT)
Dept: LAB | Facility: IMAGING CENTER | Age: 64
End: 2024-05-10
Payer: COMMERCIAL

## 2024-05-10 DIAGNOSIS — N17.9 AKI (ACUTE KIDNEY INJURY) (HCC): Primary | ICD-10-CM

## 2024-05-10 DIAGNOSIS — N17.9 AKI (ACUTE KIDNEY INJURY) (HCC): ICD-10-CM

## 2024-05-10 DIAGNOSIS — N13.30 HYDRONEPHROSIS: ICD-10-CM

## 2024-05-10 PROCEDURE — 76770 US EXAM ABDO BACK WALL COMP: CPT

## 2024-05-10 NOTE — TELEPHONE ENCOUNTER
Discussed with patient about finding of kidney ultrasound suggestive of right mild hydronephrosis, discussed with patient about need to discuss with her urologist at [facility], patient will call urologist on Monday to discuss further.  Also discussed about repeating BMP in 1 week, will mail the prescription for BMP    Urologist- Jailene Brar PA-C     Mild atrophy of the left kidney compared to the right.  Right kidney: 10.5 x 5.3 x 4.2 cm. Volume 123.2 mL  Left kidney: 9.0 x 5.0 x 3.6 cm. Volume 84.3 mL     Right kidney  The renal parenchyma is diffusely echogenic consistent with medical renal disease.  No solid mass is identified. Simple cyst in the lower pole measuring 2.4 x 1.8 x 2.0 cm.  Mild hydronephrosis noted.  4 mm shadowing calculus in the midpole 7 mm calculus in the lower pole.  No perinephric fluid collections.     Left kidney  The renal parenchyma is diffusely echogenic consistent with medical renal disease.  No mass is identified.  No hydronephrosis.

## 2024-05-23 ENCOUNTER — TELEPHONE (OUTPATIENT)
Dept: NEPHROLOGY | Facility: CLINIC | Age: 64
End: 2024-05-23

## 2024-05-23 NOTE — TELEPHONE ENCOUNTER
----- Message from Alan Huff MD sent at 5/22/2024  1:35 PM EDT -----  Please remind patient to go for repeat BMP to monitor renal function  ----- Message -----  From: SYSTEM  Sent: 5/22/2024  12:34 AM EDT  To: Alan Huff MD

## 2024-07-26 ENCOUNTER — TELEPHONE (OUTPATIENT)
Dept: NEPHROLOGY | Facility: CLINIC | Age: 64
End: 2024-07-26

## 2024-07-26 NOTE — TELEPHONE ENCOUNTER
Called pt and LVM requesting a call back to reschedule appt on 9/17 with Stacey Mckeon in the [facility], due to provider schedule change.

## 2024-07-31 ENCOUNTER — TELEPHONE (OUTPATIENT)
Dept: NEPHROLOGY | Facility: CLINIC | Age: 64
End: 2024-07-31

## 2024-07-31 NOTE — TELEPHONE ENCOUNTER
Called pt's  and reschedule appt on 9/17 with Stacey Mckeon in the [facility], due to provider schedule changes. Appt was rescheduled on 10/17 in the [facility] with Stacey Mckeon.

## 2024-08-30 ENCOUNTER — TELEPHONE (OUTPATIENT)
Dept: NEPHROLOGY | Facility: CLINIC | Age: 64
End: 2024-08-30

## 2024-08-30 NOTE — TELEPHONE ENCOUNTER
Called patient to ask if they want to schedule sooner appointment with Dr. Huff from 10/17 to opening on 9/6. There was no answer/ unable to lave a message.

## 2024-09-17 ENCOUNTER — TELEPHONE (OUTPATIENT)
Dept: NEPHROLOGY | Facility: CLINIC | Age: 64
End: 2024-09-17

## 2024-09-17 NOTE — TELEPHONE ENCOUNTER
Called patient from the wait list for Dr. Huff and spoke with her and rescheduled from 10/17 with Stacey Lainez to 9/20 at 4pm with Dr. Huff.

## 2024-12-11 DIAGNOSIS — R80.1 PERSISTENT PROTEINURIA, UNSPECIFIED: ICD-10-CM

## 2024-12-11 DIAGNOSIS — I12.9 BENIGN HYPERTENSION WITH CKD (CHRONIC KIDNEY DISEASE) STAGE IV (HCC): ICD-10-CM

## 2024-12-11 DIAGNOSIS — N25.81 SECONDARY HYPERPARATHYROIDISM OF RENAL ORIGIN (HCC): ICD-10-CM

## 2024-12-11 DIAGNOSIS — E83.39 HYPERPHOSPHATEMIA: ICD-10-CM

## 2024-12-11 DIAGNOSIS — N18.4 STAGE 4 CHRONIC KIDNEY DISEASE (HCC): Primary | ICD-10-CM

## 2024-12-11 DIAGNOSIS — D50.9 IRON DEFICIENCY ANEMIA, UNSPECIFIED IRON DEFICIENCY ANEMIA TYPE: ICD-10-CM

## 2024-12-11 DIAGNOSIS — N18.4 BENIGN HYPERTENSION WITH CKD (CHRONIC KIDNEY DISEASE) STAGE IV (HCC): ICD-10-CM

## 2024-12-11 DIAGNOSIS — N20.0 NEPHROLITHIASIS: ICD-10-CM

## 2024-12-18 ENCOUNTER — TELEPHONE (OUTPATIENT)
Dept: NEPHROLOGY | Facility: CLINIC | Age: 64
End: 2024-12-18

## 2024-12-18 DIAGNOSIS — N18.4 STAGE 4 CHRONIC KIDNEY DISEASE (HCC): Primary | ICD-10-CM

## 2024-12-18 PROBLEM — E87.20 METABOLIC ACIDOSIS: Status: ACTIVE | Noted: 2024-12-18

## 2024-12-18 PROBLEM — I13.0 HYPERTENSIVE HEART AND RENAL DISEASE WITH CONGESTIVE HEART FAILURE (HCC): Status: ACTIVE | Noted: 2024-12-18

## 2024-12-18 PROBLEM — R80.1 PERSISTENT PROTEINURIA: Status: ACTIVE | Noted: 2024-12-18

## 2024-12-18 PROBLEM — N25.81 SECONDARY HYPERPARATHYROIDISM OF RENAL ORIGIN (HCC): Status: ACTIVE | Noted: 2024-12-18

## 2024-12-18 NOTE — TELEPHONE ENCOUNTER
Unable to leave a message for Shantell as vm unavailable.  asked that she have a BMP in 1 week and follow a low potassium diet at this time as her last labs seem to be elevated. Patient no-showed for 12/18 follow up needs to be rescheduled.

## 2024-12-19 NOTE — TELEPHONE ENCOUNTER
Called Shantell and there was no answer, then called Roby,  and spoke with him. He mentioned she canceled the 12/18 appointment a few weeks back, I advised it was never canceled but we can reschedule and we did with Stacey in [facility] 5/5 1:30. I mentioned labs as in previous note too. I will send a [employer] message.

## 2025-05-05 ENCOUNTER — TELEPHONE (OUTPATIENT)
Age: 65
End: 2025-05-05

## 2025-05-06 NOTE — TELEPHONE ENCOUNTER
There was no phone number for nurse or  in chart. Pt's phone number was not working and I did leave spouse a message. Dr. Huff has an available appt on 5/8/25 but pt will need to get labs.

## 2025-05-08 NOTE — TELEPHONE ENCOUNTER
Called and spoke to spouse regarding follow up and he stated he will speak to pt and she will call us back if she would like to schedule an appt. Dr. Huff has an available appt on 5/13/25 at 9am in the .

## 2025-08-07 DIAGNOSIS — E87.20 METABOLIC ACIDOSIS: ICD-10-CM

## 2025-08-08 ENCOUNTER — TELEPHONE (OUTPATIENT)
Dept: NEPHROLOGY | Facility: CLINIC | Age: 65
End: 2025-08-08

## 2025-08-08 DIAGNOSIS — N18.4 STAGE 4 CHRONIC KIDNEY DISEASE (HCC): Primary | ICD-10-CM

## 2025-08-08 RX ORDER — SODIUM BICARBONATE 650 MG/1
650 TABLET ORAL 2 TIMES DAILY
Qty: 180 TABLET | Refills: 3 | Status: SHIPPED | OUTPATIENT
Start: 2025-08-08